# Patient Record
Sex: FEMALE | Race: WHITE | NOT HISPANIC OR LATINO | Employment: FULL TIME | URBAN - METROPOLITAN AREA
[De-identification: names, ages, dates, MRNs, and addresses within clinical notes are randomized per-mention and may not be internally consistent; named-entity substitution may affect disease eponyms.]

---

## 2017-02-11 ENCOUNTER — RADIANT APPOINTMENT (OUTPATIENT)
Dept: GENERAL RADIOLOGY | Facility: CLINIC | Age: 37
End: 2017-02-11
Attending: FAMILY MEDICINE
Payer: COMMERCIAL

## 2017-02-11 ENCOUNTER — OFFICE VISIT (OUTPATIENT)
Dept: URGENT CARE | Facility: URGENT CARE | Age: 37
End: 2017-02-11
Payer: COMMERCIAL

## 2017-02-11 VITALS
DIASTOLIC BLOOD PRESSURE: 70 MMHG | TEMPERATURE: 99 F | HEART RATE: 86 BPM | SYSTOLIC BLOOD PRESSURE: 116 MMHG | OXYGEN SATURATION: 98 %

## 2017-02-11 DIAGNOSIS — R06.02 SOB (SHORTNESS OF BREATH): Primary | ICD-10-CM

## 2017-02-11 DIAGNOSIS — R06.02 SOB (SHORTNESS OF BREATH): ICD-10-CM

## 2017-02-11 PROCEDURE — 71020 XR CHEST 2 VW: CPT

## 2017-02-11 PROCEDURE — 99203 OFFICE O/P NEW LOW 30 MIN: CPT | Performed by: FAMILY MEDICINE

## 2017-02-11 RX ORDER — COPPER 313.4 MG/1
1 INTRAUTERINE DEVICE INTRAUTERINE ONCE
COMMUNITY

## 2017-02-11 RX ORDER — ALBUTEROL SULFATE 90 UG/1
2 AEROSOL, METERED RESPIRATORY (INHALATION) EVERY 6 HOURS PRN
Qty: 1 INHALER | Refills: 0 | Status: SHIPPED | OUTPATIENT
Start: 2017-02-11

## 2017-02-11 NOTE — MR AVS SNAPSHOT
After Visit Summary   2/11/2017    Margaret Boyd    MRN: [id number]           Patient Information     Date Of Birth          1980        Visit Information        Provider Department      2/11/2017 8:15 PM Aime Light MD [facility]        Today's Diagnoses     SOB (shortness of breath)    -  1       Care Instructions    Okay to take ibuprofen 200 mg - 4 tablets (800 mg) every 8 hours as needed.  Okay to take tylenol 500 mg - 2 tablets (1000 mg) every 6-8 hours as needed, do not exceed 3000 mg in 24 hours.  Consider trying albuterol inhaler to help with shortness of breath      Shortness of Breath (Dyspnea)  Shortness of breath is the feeling that you can't catch your breath or get enough air. It is also known as dyspnea.  Dyspnea can be caused by many different conditions. They include:    Acute asthma attack.    Worsening of chronic lung diseases such as chronic bronchitis and emphysema.     Heart failure. This is when weak heart muscle allows extra fluid to collect in the lungs.    Panic attacks or anxiety. Fear can cause rapid breathing (hyperventilation).    Pneumonia, or an infection in the lung tissue.    Exposure to toxic substances, fumes, smoke, or certain medicines.    Blood clot in the lung (pulmonary embolism). This is often from a piece of blood clot in a deep vein of the leg (deep vein thrombosis) that breaks off and travels to the lungs.     Heart attack or heart-related chest pain (angina).    Anemia.    Collapsed lung (pneumothorax).    Dehydration.    Pregnancy.  Based on your visit today, the exact cause of your shortness of breath is not certain. Your tests don t show any of the serious causes of dyspnea. You may need other tests to find out if you have a serious problem. It s important to watch for any new symptoms or symptoms that get worse. Follow up with your healthcare provider as directed.  Home care  Follow these tips to take care of yourself at  home:    When your symptoms are better, go back to your usual activities.    If you smoke, you should stop. Join a quit-smoking program or ask your healthcare provider for help.    Eat a healthy diet and get plenty of sleep.    Get regular exercise. Talk with your healthcare provider before starting to exercise, especially if you have other medical problems.    Cut down on the amount of caffeine and stimulants you consume.  Follow-up care  Follow up with your healthcare provider, or as advised.  If tests were done, you will be told if your treatment needs to be changed. You can call as directed for the results.  (Note: If an X-ray was taken, a specialist will review it. You will be notified of any new findings that may affect your care.)  Call 911 or get immediate medical care  Shortness of breath may be a sign of a serious medical problem. For example, it may be a problem with your heart or lungs. Call 911 if you have worsening shortness of breath or trouble breathing, especially with any of the symptoms below:    You are confused or it s difficult to wake you.    You faint or lose consciousness.    You have a fast heartbeat, or your heartbeat is irregular.     You are coughing up blood.    You have pain in your chest, arm, shoulder, neck, or upper back.    You break out in a sweat.  When to seek medical advice  Call your healthcare provider right away if any of these occur:    Slight shortness of breath or wheezing     Redness, pain or swelling in your leg, arm, or other body area    Swelling in both legs or ankles    Fast weight gain    Dizziness or weakness    Fever of 100.4 F (38 C) or higher, or as directed by your healthcare provider    9940-5686 [employer]. [address] [place] [address], [place] [address]. All rights reserved. This information is not intended as a substitute for professional medical care. Always follow your healthcare professional's instructions.        Pleurisy  You have pain in  your chest. Your healthcare provider has told you that you have pleurisy, or pleuritis. Pleurisy is swelling (inflammation) of the pleura. The pleura are two layers of thin smooth tissue that surround the lungs and line the chest.  What are the symptoms of pleurisy?     Pleurisy is inflammation of the pleura. The pleura cover the lungs and line the chest.   Pleurisy usually causes sharp chest pain. It is usually worse when you take a deep breath, cough, or sneeze.  What causes pleurisy?  Many things can cause pleurisy. A common cause is a viral infection like the flu or pneumonia. Serious lung problems that can cause it include:    A blood clot in the lung (pulmonary embolism)    Air between the pleura (pneumothorax)  Serious heart problems that can cause pleurisy include:    Heart attack    Inflammation of the covering of the heart (pericarditis)  How is pleurisy diagnosed?  Your healthcare provider examines you and asks you about your symptoms and health history. He or she will first check you for the serious causes of chest pain. You may have:    Lab tests    Imaging tests such as chest X-ray, CT scan, or ultrasound  How is pleurisy treated?  Treatment depends on what is causing the pleurisy. Serious conditions are treated in the hospital. You may need medicines to decrease the inflammation and pain.     Call 911    Trouble breathing    Chest pain that gets worse  Call your healthcare provider  Call your healthcare provider right away if you have:    Fever      5054-1700 [employer]. [address] [place] [address]. All rights reserved. This information is not intended as a substitute for professional medical care. Always follow your healthcare professional's instructions.              Follow-ups after your visit        Who to contact     If you have questions or need follow up information about today's clinic visit or your schedule please contact [facility] directly at  "[phone].  Normal or non-critical lab and imaging results will be communicated to you by [employer], letter or phone within 4 business days after the clinic has received the results. If you do not hear from us within 7 days, please contact the clinic through [employer] or phone. If you have a critical or abnormal lab result, we will notify you by phone as soon as possible.  Submit refill requests through [employer] or call your pharmacy and they will forward the refill request to us. Please allow 3 business days for your refill to be completed.          Additional Information About Your Visit        [employer] Information     [employer] lets you send messages to your doctor, view your test results, renew your prescriptions, schedule appointments and more. To sign up, go to [email or web address] . Click on \"Log in\" on the left side of the screen, which will take you to the Welcome page. Then click on \"Sign up Now\" on the right side of the page.     You will be asked to enter the access code listed below, as well as some personal information. Please follow the directions to create your username and password.     Your access code is: [email or web address]  Expires: 2017  9:12 PM     Your access code will  in 90 days. If you need help or a new code, please call your [facility] clinic or [phone].        Care EveryWhere ID     This is your Care EveryWhere ID. This could be used by other organizations to access your [facility] medical records  [id number]        Your Vitals Were     Pulse Temperature Pulse Oximetry             86 99  F (37.2  C) (Oral) 98%          Blood Pressure from Last 3 Encounters:   17 116/70   11/10/13 122/86   13 108/68    Weight from Last 3 Encounters:   13 160 lb (72.576 kg)   11 158 lb (71.668 kg)                 Today's Medication Changes          These changes are accurate as of: 17  9:12 PM.  If you have any questions, ask your nurse or doctor.               Start " taking these medicines.        Dose/Directions    albuterol 108 (90 BASE) MCG/ACT Inhaler   Commonly known as:  PROAIR HFA/PROVENTIL HFA/VENTOLIN HFA   Used for:  SOB (shortness of breath)   Started by:  Aime Light MD        Dose:  2 puff   Inhale 2 puffs into the lungs every 6 hours as needed for shortness of breath / dyspnea or wheezing   Quantity:  1 Inhaler   Refills:  0            Where to get your medicines      Some of these will need a paper prescription and others can be bought over the counter.  Ask your nurse if you have questions.     Bring a paper prescription for each of these medications    - albuterol 108 (90 BASE) MCG/ACT Inhaler             Primary Care Provider    None       No address on file        Thank you!     Thank you for choosing [facility]  for your care. Our goal is always to provide you with excellent care. Hearing back from our patients is one way we can continue to improve our services. Please take a few minutes to complete the written survey that you may receive in the mail after your visit with us. Thank you!             Your Updated Medication List - Protect others around you: Learn how to safely use, store and throw away your medicines at www.disposemymeds.org.          This list is accurate as of: 2/11/17  9:12 PM.  Always use your most recent med list.                   Brand Name Dispense Instructions for use    albuterol 108 (90 BASE) MCG/ACT Inhaler    PROAIR HFA/PROVENTIL HFA/VENTOLIN HFA    1 Inhaler    Inhale 2 puffs into the lungs every 6 hours as needed for shortness of breath / dyspnea or wheezing       ibuprofen 400-800 mg tablet    ADVIL,MOTRIN    90 tablet    Take 1-2 tablets (400-800 mg) by mouth every 6 hours as needed (cramping)       paragard intrauterine copper      1 each by Intrauterine route once

## 2017-02-12 NOTE — PROGRESS NOTES
"Chief Complaint   Patient presents with     Urgent Care     Shortness of Breath     onset am, unable to take a deep breath after exercising today.  No history of asthma, nonsmoker. denies cold symptoms and N&V.  Pt reports tinges in chest today, left side, fatigue.       Initial /70 mmHg  Pulse 86  Temp(Src) 99  F (37.2  C) (Oral)  SpO2 98% Estimated body mass index is 23.62 kg/(m^2) as calculated from the following:    Height as of 8/22/13: 5' 9\" (1.753 m).    Weight as of 8/22/13: 160 lb (72.576 kg).  BP completed using cuff size: nikkie Reynolds CMA                                2/11/2017 8:24 PM       "

## 2017-02-12 NOTE — PROGRESS NOTES
SUBJECTIVE:   Margaret Boyd is a 36 year old female presenting with a chief complaint of SOB.    Symptoms started this morning when she was at cycle gym class.  Has been cycling for long time and this is normal routine for her to go for 45 minutes.  Patient noted that developed SOB and had to stop, thought unusual but did seem to resolve on its own.  This evening was just sitting and developed similar sensation of SOB and trouble with catching her breath, decided to come in to .  Had more sharp, stabbing sensation in sternal area while waiting in line for check in.  Denies any URI symptoms, fever, cough.  States that did have URI symptoms several weeks ago which resolved.    Planning to see primary thru [facility].    No past medical history on file.  Current Outpatient Prescriptions   Medication Sig Dispense Refill     paragard intrauterine copper 1 each by Intrauterine route once       ibuprofen (ADVIL,MOTRIN) 400-800 mg tablet Take 1-2 tablets (400-800 mg) by mouth every 6 hours as needed (cramping) 90 tablet 0     Social History   Substance Use Topics     Smoking status: Former Smoker     Smokeless tobacco: Not on file     Alcohol Use: No       ROS:  CONSTITUTIONAL:NEGATIVE for fever, chills, change in weight  INTEGUMENTARY/SKIN: NEGATIVE for worrisome rashes, moles or lesions  ENT/MOUTH: NEGATIVE for ear, mouth and throat problems  RESP:POSITIVE for SOB/dyspnea  CV: NEGATIVE for chest pain, palpitations or peripheral edema  GI: NEGATIVE for nausea, abdominal pain, heartburn, or change in bowel habits    OBJECTIVE  :/70 mmHg  Pulse 86  Temp(Src) 99  F (37.2  C) (Oral)  SpO2 98%  GENERAL APPEARANCE: healthy, alert and no distress  EYES: EOMI,  PERRL, conjunctiva clear  HENT: ear canals and TM's normal.  Nose and mouth without ulcers, erythema or lesions  NECK: supple, nontender, no lymphadenopathy  RESP: lungs clear to auscultation - no rales, rhonchi or wheezes  CV: regular rates and  rhythm, normal S1 S2, no murmur noted  NEURO: Normal strength and tone, sensory exam grossly normal,  normal speech and mentation  SKIN: no suspicious lesions or rashes  PSYCH: mentation appears normal and affect normal/bright    CXR - no acute infiltrate, no pleural effusion, no pneumothorax    ASSESSMENT/PLAN:  (R06.02) SOB (shortness of breath)  (primary encounter diagnosis)  Plan: XR Chest 2 Views, albuterol (PROAIR         HFA/PROVENTIL HFA/VENTOLIN HFA) 108 (90 BASE)         MCG/ACT Inhaler            Unclear etiology for SOB presentation, vitals stable with normal O2 saturation and CXR normal, no acute respiratory distress.  Reviewed possible pleurisy causing symptoms and encourage to take NSAIDs.  RX albuterol inhaler given for symptomatic treatment of SOB.    Recommend to follow up with primary provider if no resolution of symptoms.  Aime Light MD

## 2017-02-12 NOTE — PATIENT INSTRUCTIONS
Okay to take ibuprofen 200 mg - 4 tablets (800 mg) every 8 hours as needed.  Okay to take tylenol 500 mg - 2 tablets (1000 mg) every 6-8 hours as needed, do not exceed 3000 mg in 24 hours.  Consider trying albuterol inhaler to help with shortness of breath      Shortness of Breath (Dyspnea)  Shortness of breath is the feeling that you can't catch your breath or get enough air. It is also known as dyspnea.  Dyspnea can be caused by many different conditions. They include:    Acute asthma attack.    Worsening of chronic lung diseases such as chronic bronchitis and emphysema.     Heart failure. This is when weak heart muscle allows extra fluid to collect in the lungs.    Panic attacks or anxiety. Fear can cause rapid breathing (hyperventilation).    Pneumonia, or an infection in the lung tissue.    Exposure to toxic substances, fumes, smoke, or certain medicines.    Blood clot in the lung (pulmonary embolism). This is often from a piece of blood clot in a deep vein of the leg (deep vein thrombosis) that breaks off and travels to the lungs.     Heart attack or heart-related chest pain (angina).    Anemia.    Collapsed lung (pneumothorax).    Dehydration.    Pregnancy.  Based on your visit today, the exact cause of your shortness of breath is not certain. Your tests don t show any of the serious causes of dyspnea. You may need other tests to find out if you have a serious problem. It s important to watch for any new symptoms or symptoms that get worse. Follow up with your healthcare provider as directed.  Home care  Follow these tips to take care of yourself at home:    When your symptoms are better, go back to your usual activities.    If you smoke, you should stop. Join a quit-smoking program or ask your healthcare provider for help.    Eat a healthy diet and get plenty of sleep.    Get regular exercise. Talk with your healthcare provider before starting to exercise, especially if you have other medical  problems.    Cut down on the amount of caffeine and stimulants you consume.  Follow-up care  Follow up with your healthcare provider, or as advised.  If tests were done, you will be told if your treatment needs to be changed. You can call as directed for the results.  (Note: If an X-ray was taken, a specialist will review it. You will be notified of any new findings that may affect your care.)  Call 911 or get immediate medical care  Shortness of breath may be a sign of a serious medical problem. For example, it may be a problem with your heart or lungs. Call 911 if you have worsening shortness of breath or trouble breathing, especially with any of the symptoms below:    You are confused or it s difficult to wake you.    You faint or lose consciousness.    You have a fast heartbeat, or your heartbeat is irregular.     You are coughing up blood.    You have pain in your chest, arm, shoulder, neck, or upper back.    You break out in a sweat.  When to seek medical advice  Call your healthcare provider right away if any of these occur:    Slight shortness of breath or wheezing     Redness, pain or swelling in your leg, arm, or other body area    Swelling in both legs or ankles    Fast weight gain    Dizziness or weakness    Fever of 100.4 F (38 C) or higher, or as directed by your healthcare provider    9444-8456 [employer]. [address] [place], [place] [id number]. All rights reserved. This information is not intended as a substitute for professional medical care. Always follow your healthcare professional's instructions.        Pleurisy  You have pain in your chest. Your healthcare provider has told you that you have pleurisy, or pleuritis. Pleurisy is swelling (inflammation) of the pleura. The pleura are two layers of thin smooth tissue that surround the lungs and line the chest.  What are the symptoms of pleurisy?     Pleurisy is inflammation of the pleura. The pleura cover the lungs and line the  chest.   Pleurisy usually causes sharp chest pain. It is usually worse when you take a deep breath, cough, or sneeze.  What causes pleurisy?  Many things can cause pleurisy. A common cause is a viral infection like the flu or pneumonia. Serious lung problems that can cause it include:    A blood clot in the lung (pulmonary embolism)    Air between the pleura (pneumothorax)  Serious heart problems that can cause pleurisy include:    Heart attack    Inflammation of the covering of the heart (pericarditis)  How is pleurisy diagnosed?  Your healthcare provider examines you and asks you about your symptoms and health history. He or she will first check you for the serious causes of chest pain. You may have:    Lab tests    Imaging tests such as chest X-ray, CT scan, or ultrasound  How is pleurisy treated?  Treatment depends on what is causing the pleurisy. Serious conditions are treated in the hospital. You may need medicines to decrease the inflammation and pain.     Call 911    Trouble breathing    Chest pain that gets worse  Call your healthcare provider  Call your healthcare provider right away if you have:    Fever      [phone] [employer]. [address] [place] [address]. All rights reserved. This information is not intended as a substitute for professional medical care. Always follow your healthcare professional's instructions.

## 2022-08-28 ENCOUNTER — HEALTH MAINTENANCE LETTER (OUTPATIENT)
Age: 42
End: 2022-08-28

## 2022-10-14 ENCOUNTER — TELEPHONE (OUTPATIENT)
Dept: NEUROLOGY | Facility: CLINIC | Age: 42
End: 2022-10-14

## 2022-10-14 NOTE — TELEPHONE ENCOUNTER
----- Message from Meghana Lockhart RN sent at 10/14/2022 11:33 AM CDT -----  Regarding: Appointment  Hello,   We noticed that pt's appt is scheduled during a blocked time for Dr. Juarez on 1/6/23.  Can you call to switch pt to 1/13/23 at 1 PM in the [facility] spot please.     Thank you,   Meghana

## 2023-01-14 ENCOUNTER — HEALTH MAINTENANCE LETTER (OUTPATIENT)
Age: 43
End: 2023-01-14

## 2023-09-30 ENCOUNTER — HEALTH MAINTENANCE LETTER (OUTPATIENT)
Age: 43
End: 2023-09-30

## 2024-02-17 ENCOUNTER — HEALTH MAINTENANCE LETTER (OUTPATIENT)
Age: 44
End: 2024-02-17